# Patient Record
Sex: FEMALE | HISPANIC OR LATINO | URBAN - METROPOLITAN AREA
[De-identification: names, ages, dates, MRNs, and addresses within clinical notes are randomized per-mention and may not be internally consistent; named-entity substitution may affect disease eponyms.]

---

## 2018-11-29 ENCOUNTER — OFFICE VISIT (OUTPATIENT)
Dept: URBAN - METROPOLITAN AREA CLINIC 11 | Facility: CLINIC | Age: 58
End: 2018-11-29
Payer: MEDICAID

## 2018-11-29 DIAGNOSIS — E11.9 TYPE 2 DIABETES MELLITUS W/O COMPLICATION: ICD-10-CM

## 2018-11-29 DIAGNOSIS — H40.1132 PRIMARY OPEN-ANGLE GLAUCOMA, BILATERAL, MODERATE STAGE: Primary | ICD-10-CM

## 2018-11-29 PROCEDURE — 92014 COMPRE OPH EXAM EST PT 1/>: CPT | Performed by: OPTOMETRIST

## 2018-11-29 PROCEDURE — 92133 CPTRZD OPH DX IMG PST SGM ON: CPT | Performed by: OPTOMETRIST

## 2018-11-29 RX ORDER — LATANOPROST 50 UG/ML
0.005 % SOLUTION OPHTHALMIC
Qty: 3 | Refills: 3 | Status: INACTIVE
Start: 2018-11-29 | End: 2019-04-30

## 2018-11-29 RX ORDER — TIMOLOL MALEATE 5 MG/ML
0.5 % SOLUTION/ DROPS OPHTHALMIC
Qty: 3 | Refills: 3 | Status: INACTIVE
Start: 2018-11-29 | End: 2019-04-30

## 2018-11-29 ASSESSMENT — INTRAOCULAR PRESSURE
OS: 18
OD: 18

## 2018-11-29 NOTE — IMPRESSION/PLAN
Impression: Primary open-angle glaucoma, bilateral, moderate stage: Y23.3084. Plan: OCT of RNFL ordered and performed today ou. IOP slightly elevated OU. (pt ran out of latanoprost and timolol) Restart Latanoprost 1gt qhs ou and Timolol bid OU. Pt ed on importance of drops and and regular follow ups. F/u 3 months VF IOP Tmax 19/19; /477; OCT 11/18 5/7 49/69(54/71)(56/76); VF 3/18 OD IA, OS Full; photos 3/17

## 2018-11-29 NOTE — IMPRESSION/PLAN
Impression: Type 2 diabetes mellitus w/o complication: P53.6. Plan: No signs of retinopathy or neovascularization noted. Discussed ocular and systemic benefits of blood sugar control.  RTC 1yr complete exam

## 2019-04-30 ENCOUNTER — OFFICE VISIT (OUTPATIENT)
Dept: URBAN - METROPOLITAN AREA CLINIC 11 | Facility: CLINIC | Age: 59
End: 2019-04-30
Payer: MEDICAID

## 2019-04-30 PROCEDURE — 99213 OFFICE O/P EST LOW 20 MIN: CPT | Performed by: OPTOMETRIST

## 2019-04-30 RX ORDER — TIMOLOL MALEATE 5 MG/ML
0.5 % SOLUTION/ DROPS OPHTHALMIC
Qty: 3 | Refills: 3 | Status: INACTIVE
Start: 2019-04-30 | End: 2020-09-16

## 2019-04-30 RX ORDER — LATANOPROST 50 UG/ML
0.005 % SOLUTION OPHTHALMIC
Qty: 3 | Refills: 3 | Status: INACTIVE
Start: 2019-04-30 | End: 2020-03-16

## 2019-04-30 ASSESSMENT — INTRAOCULAR PRESSURE
OD: 17
OS: 17

## 2019-04-30 NOTE — IMPRESSION/PLAN
Impression: Primary open-angle glaucoma, bilateral, moderate stage: E22.0508. Plan: IOP slightly elevated OU. (pt ran out of latanoprost and timolol for several weeks. she restarted drops 2weeks ago.) Cont Latanoprost 1gt qhs ou and Timolol bid OU. Pt ed on importance of drops and and regular follow ups. F/u 3 months VF IOP photos Tmax 19/19; /477; OCT 11/18 5/7 49/69(54/71)(56/76); VF 3/18 OD IA, OS Full; photos 3/17

## 2019-08-26 ENCOUNTER — TESTING ONLY (OUTPATIENT)
Dept: URBAN - METROPOLITAN AREA CLINIC 11 | Facility: CLINIC | Age: 59
End: 2019-08-26
Payer: MEDICAID

## 2019-08-26 PROCEDURE — 92083 EXTENDED VISUAL FIELD XM: CPT | Performed by: OPTOMETRIST

## 2019-09-04 ENCOUNTER — OFFICE VISIT (OUTPATIENT)
Dept: URBAN - METROPOLITAN AREA CLINIC 11 | Facility: CLINIC | Age: 59
End: 2019-09-04
Payer: MEDICAID

## 2019-09-04 PROCEDURE — 92012 INTRM OPH EXAM EST PATIENT: CPT | Performed by: OPTOMETRIST

## 2019-09-04 PROCEDURE — 92250 FUNDUS PHOTOGRAPHY W/I&R: CPT | Performed by: OPTOMETRIST

## 2019-09-04 ASSESSMENT — INTRAOCULAR PRESSURE
OS: 14
OD: 14

## 2019-09-04 NOTE — IMPRESSION/PLAN
Impression: Primary open-angle glaucoma, bilateral, moderate stage: Z89.6952. Plan: Photos ordered and performed today ou. VF reviewed today - VF stable ou. IOP at good today OU. Cont Latanoprost 1gt qhs ou and Timolol bid OU. Pt ed on importance of drops and regular follow ups. F/u 3-4mns IOP DE OCT Tmax 19/19; /477; OCT 11/18 5/7 49/69(54/71)(56/76); VF 9/19 OD IA, OS Full; photos 9/19

## 2019-12-04 ENCOUNTER — OFFICE VISIT (OUTPATIENT)
Dept: URBAN - METROPOLITAN AREA CLINIC 11 | Facility: CLINIC | Age: 59
End: 2019-12-04
Payer: MEDICAID

## 2019-12-04 PROCEDURE — 92133 CPTRZD OPH DX IMG PST SGM ON: CPT | Performed by: OPTOMETRIST

## 2019-12-04 PROCEDURE — 92014 COMPRE OPH EXAM EST PT 1/>: CPT | Performed by: OPTOMETRIST

## 2019-12-04 ASSESSMENT — INTRAOCULAR PRESSURE
OS: 17
OD: 17

## 2019-12-04 NOTE — IMPRESSION/PLAN
Impression: Primary open-angle glaucoma, bilateral, moderate stage: S09.2123. Plan: OCT of RNFL ordered and performed today ou. RNFL stable ou. IOP at good today OU. Cont Latanoprost 1gt qhs ou and Timolol bid OU. Pt ed on importance of drops and regular follow ups. schedule VF 24-2. F/u 3-4mns IOP, VF, Photos Tmax 19/19; /477; OCT 12/19 9/8 55/75(49/69)(54/71)(56/76); VF 9/19 OD IA, OS Full; photos 9/19

## 2020-05-18 ENCOUNTER — OFFICE VISIT (OUTPATIENT)
Dept: URBAN - METROPOLITAN AREA CLINIC 11 | Facility: CLINIC | Age: 60
End: 2020-05-18
Payer: MEDICAID

## 2020-05-18 PROCEDURE — 99213 OFFICE O/P EST LOW 20 MIN: CPT | Performed by: OPTOMETRIST

## 2020-05-18 ASSESSMENT — INTRAOCULAR PRESSURE
OS: 14
OD: 14

## 2020-05-18 NOTE — IMPRESSION/PLAN
Impression: Primary open-angle glaucoma, bilateral, moderate stage: K55.0066. Plan: IOP at good today OU. Cont Latanoprost 1gt qhs ou and Timolol bid OU. Pt ed on importance of drops and regular follow ups. F/u 3-4mns IOP, Tmax 19/19; /477; OCT 12/19 9/8 55/75(49/69)(54/71)(56/76); VF 9/19 OD IA, OS Full; photos 9/19

## 2020-09-16 ENCOUNTER — OFFICE VISIT (OUTPATIENT)
Dept: URBAN - METROPOLITAN AREA CLINIC 11 | Facility: CLINIC | Age: 60
End: 2020-09-16
Payer: MEDICAID

## 2020-09-16 PROCEDURE — 92083 EXTENDED VISUAL FIELD XM: CPT | Performed by: OPTOMETRIST

## 2020-09-16 PROCEDURE — 92012 INTRM OPH EXAM EST PATIENT: CPT | Performed by: OPTOMETRIST

## 2020-09-16 RX ORDER — LATANOPROST 50 UG/ML
0.005 % SOLUTION OPHTHALMIC
Qty: 7.5 | Refills: 3 | Status: INACTIVE
Start: 2020-09-16 | End: 2021-06-02

## 2020-09-16 RX ORDER — TIMOLOL MALEATE 5 MG/ML
0.5 % SOLUTION/ DROPS OPHTHALMIC
Qty: 3 | Refills: 3 | Status: INACTIVE
Start: 2020-09-16 | End: 2021-06-02

## 2020-09-16 ASSESSMENT — INTRAOCULAR PRESSURE
OS: 15
OD: 15

## 2020-09-16 NOTE — IMPRESSION/PLAN
Impression: Primary open-angle glaucoma, bilateral, moderate stage: Z36.7997. Plan: IOP at good today OU. Cont Latanoprost 1gt qhs ou and Timolol bid OU. Pt ed on importance of drops and regular follow ups. F/u 4mns IOP DE OCT Tmax 19/19; /477; OCT 12/19 9/8 55/75(49/69)(54/71)(56/76); VF 9/20 OD IA, OS scattered; photos 9/19

## 2020-09-16 NOTE — ASSESSMENT/PLAN
Impression: Visual Field - OD: Good-inferior arcuates; OS: Good-mild scattered defects Plan: Continue drops. No change in treatment based on test results.

## 2021-01-18 ENCOUNTER — OFFICE VISIT (OUTPATIENT)
Dept: URBAN - METROPOLITAN AREA CLINIC 11 | Facility: CLINIC | Age: 61
End: 2021-01-18
Payer: MEDICAID

## 2021-01-18 PROCEDURE — 92014 COMPRE OPH EXAM EST PT 1/>: CPT | Performed by: OPTOMETRIST

## 2021-01-18 PROCEDURE — 92133 CPTRZD OPH DX IMG PST SGM ON: CPT | Performed by: OPTOMETRIST

## 2021-01-18 ASSESSMENT — INTRAOCULAR PRESSURE
OD: 14
OS: 14

## 2021-01-18 NOTE — IMPRESSION/PLAN
Impression: Primary open-angle glaucoma, bilateral, moderate stage: B46.4885. Plan: OCT of RNFL ordered and performed today ou. IOP at good level today OU. Cont Latanoprost 1gt qhs ou and Timolol bid OU. Pt ed on importance of drops and regular follow ups. F/u 4mns IOP and photos Tmax 19/19; /477; OCT 1/21 7/7 53/71(55/75)(49/69)(54/71)(56/76); VF 9/20 OD IA, OS scattered; photos 9/19

## 2021-01-18 NOTE — ASSESSMENT/PLAN
Impression: OCT - OD: Good-thinning; OS: Good-thinning Plan: Continue drops. No change in treatment based on test results.

## 2021-01-18 NOTE — IMPRESSION/PLAN
Impression: Type 2 diabetes mellitus w/o complication: K75.1. Plan: No signs of retinopathy or neovascularization noted. Discussed ocular and systemic benefits of blood sugar control.  RTC 1yr complete exam

## 2021-06-02 ENCOUNTER — OFFICE VISIT (OUTPATIENT)
Dept: URBAN - METROPOLITAN AREA CLINIC 11 | Facility: CLINIC | Age: 61
End: 2021-06-02
Payer: MEDICAID

## 2021-06-02 PROCEDURE — 99213 OFFICE O/P EST LOW 20 MIN: CPT | Performed by: OPTOMETRIST

## 2021-06-02 PROCEDURE — 92250 FUNDUS PHOTOGRAPHY W/I&R: CPT | Performed by: OPTOMETRIST

## 2021-06-02 RX ORDER — LATANOPROST 50 UG/ML
0.005 % SOLUTION OPHTHALMIC
Qty: 7.5 | Refills: 6 | Status: ACTIVE
Start: 2021-06-02

## 2021-06-02 RX ORDER — TIMOLOL MALEATE 5 MG/ML
0.5 % SOLUTION/ DROPS OPHTHALMIC
Qty: 7.5 | Refills: 6 | Status: INACTIVE
Start: 2021-06-02 | End: 2021-12-22

## 2021-06-02 ASSESSMENT — INTRAOCULAR PRESSURE
OD: 16
OS: 16

## 2021-06-02 NOTE — IMPRESSION/PLAN
Impression: Primary open-angle glaucoma, bilateral, moderate stage: A85.3923. Tmax 19/19; /477; OCT 1/21 7/7 53/71(55/75)(49/69)(54/71)(56/76); VF 9/20 OD IA, OS scattered; photos 06/21 Plan: Photos ONH ordered and performed today ou. IOP slightly elevated today OU. Cont Latanoprost 1gt qhs ou. Pt ran out of drops Restart Rx Timolol bid OU. Pt ed on importance of drops and regular follow ups.   F/u 4mns IOP & VF